# Patient Record
Sex: FEMALE
[De-identification: names, ages, dates, MRNs, and addresses within clinical notes are randomized per-mention and may not be internally consistent; named-entity substitution may affect disease eponyms.]

---

## 2023-03-23 ENCOUNTER — NURSE TRIAGE (OUTPATIENT)
Dept: OTHER | Facility: CLINIC | Age: 38
End: 2023-03-23

## 2023-03-23 NOTE — TELEPHONE ENCOUNTER
Pt is asking if Virtual Visit's are available with her insurance plan. She denies the need for triage. Call transferred to [facility]. Reason for Disposition   Information only question and nurse able to answer    Protocols used:  Information Only Call - No Triage-ADULT-[place]